# Patient Record
(demographics unavailable — no encounter records)

---

## 2024-10-17 NOTE — BIRTH HISTORY
Show Aperture Variable?: Yes Detail Level: Detailed [At ___ Weeks Gestation] : at [unfilled] weeks gestation [[place] : in [place] [ Section] : by  section [None] : there were no delivery complications [Age Appropriate] : age appropriate developmental milestones met Post-Care Instructions: I reviewed with the patient in detail post-care instructions. Patient is to wear sunprotection, and avoid picking at any of the treated lesions. Pt may apply Vaseline to crusted or scabbing areas. Render Note In Bullet Format When Appropriate: No Duration Of Freeze Thaw-Cycle (Seconds): 0 Consent: The patient's consent was obtained including but not limited to risks of crusting, scabbing, blistering, scarring, darker or lighter pigmentary change, recurrence, incomplete removal and infection.

## 2024-10-17 NOTE — REASON FOR VISIT
[Follow-Up Evaluation] : a follow-up evaluation for [Seizure] : seizure [Patient] : patient [Parents] : parents [Medical Records] : medical records

## 2024-10-17 NOTE — PHYSICAL EXAM
[Well-appearing] : well-appearing [Normocephalic] : normocephalic [No dysmorphic facial features] : no dysmorphic facial features [No abnormal neurocutaneous stigmata or skin lesions] : no abnormal neurocutaneous stigmata or skin lesions [Straight] : straight [No shalonda or dimples] : no shalonda or dimples [No deformities] : no deformities [Alert] : alert [Well related, good eye contact] : well related, good eye contact [Conversant] : conversant [Normal speech and language] : normal speech and language [Follows instructions well] : follows instructions well [VFF] : VFF [Pupils reactive to light and accommodation] : pupils reactive to light and accommodation [Full extraocular movements] : full extraocular movements [Normal facial sensation to light touch] : normal facial sensation to light touch [No facial asymmetry or weakness] : no facial asymmetry or weakness [Gross hearing intact] : gross hearing intact [Equal palate elevation] : equal palate elevation [Good shoulder shrug] : good shoulder shrug [Normal tongue movement] : normal tongue movement [Midline tongue, no fasciculations] : midline tongue, no fasciculations [Normal axial and appendicular muscle tone] : normal axial and appendicular muscle tone [Gets up on table without difficulty] : gets up on table without difficulty [No pronator drift] : no pronator drift [Normal finger tapping and fine finger movements] : normal finger tapping and fine finger movements [No abnormal involuntary movements] : no abnormal involuntary movements [5/5 strength in proximal and distal muscles of arms and legs] : 5/5 strength in proximal and distal muscles of arms and legs [Walks and runs well] : walks and runs well [Able to do deep knee bend] : able to do deep knee bend [Able to walk on heels] : able to walk on heels [Able to walk on toes] : able to walk on toes [Localizes LT and temperature] : localizes LT and temperature [No dysmetria on FTNT] : no dysmetria on FTNT [Good walking balance] : good walking balance [Normal gait] : normal gait [Able to tandem well] : able to tandem well [Negative Romberg] : negative Romberg

## 2024-10-23 NOTE — HISTORY OF PRESENT ILLNESS
[FreeTextEntry1] :  NAYANA GIL is a 17 year old male with no significant past medical history here for seizures.  Interval Hx: Nayana has been doing well. He says he continues to get the feeling of confusion, and anxiousness, and then starts getting dizzy. It goes away on its own in about 1 minute. This has happened once or twice since last visit. Denies any other concern for seizures since last visit. Mother says he is an anxious kid and does get nervous often.   HPI (September 2024):  Nayana had a first time seizure on August 18. This occurred around 1 in the afternoon. He finished eating and went to go sit down. He then had stiffening of the entire body and he was shaking. Sister then laid him on the floor on his side. He was foaming at the mouth and his eyes were open wide. This lasted about 2 minutes. When he started to wake up he had an episode of emesis. After he woke up he was very confused and father said he seemed "out of it" for about 10 minutes. He had a HA and was lethargic for the rest of the day. He was then evaluated in [place] ED for monitoring and given IVF.  Prior to seizure Nayana reports feeling dizzy, anxious, and remembering random dreams he has had. He then got nauseous. The aura he felt prior to seizure he experienced a few times in the past, most recently in May, but never had a seizure after. In May he had an episode of syncope after and then emesis. Denies any previous seizure activity. Denies any myoclonic jerks in sleep or when awakening from sleep.

## 2024-10-23 NOTE — ASSESSMENT
[FreeTextEntry1] :  NAYANA is a 17 year old here with parents for a follow up for seizure like activity.  Non focal neuro exam. rEEG and brain MRI normal, however episode still dose seem very likely to be seizure activity. Will get aEEG. Seizure precautions discussed.

## 2024-10-23 NOTE — CONSULT LETTER
[Dear  ___] : Dear  [unfilled], [Consult Letter:] : I had the pleasure of evaluating your patient, [unfilled]. [Please see my note below.] : Please see my note below. [Consult Closing:] : Thank you very much for allowing me to participate in the care of this patient.  If you have any questions, please do not hesitate to contact me. [Sincerely,] : Sincerely, [FreeTextEntry3] : [place] Claudio, FAZAL-BC Board Certified Family Nurse Practitioner Pediatric Neurology [facility] [address] [place] [address] Tel: [phone] Fax: [phone]

## 2024-10-23 NOTE — HISTORY OF PRESENT ILLNESS
[FreeTextEntry1] :  NAYANA GIL is a 17 year old male with no significant past medical history here for seizures.  Interval Hx: Nayana has been doing well. He says he continues to get the feeling of confusion, and anxiousness, and then starts getting dizzy. It goes away on its own in about 1 minute. This has happened once or twice since last visit. Denies any other concern for seizures since last visit. Mother says he is an anxious kid and does get nervous often.   HPI (September 2024):  Nayana had a first time seizure on August 18. This occurred around 1 in the afternoon. He finished eating and went to go sit down. He then had stiffening of the entire body and he was shaking. Sister then laid him on the floor on his side. He was foaming at the mouth and his eyes were open wide. This lasted about 2 minutes. When he started to wake up he had an episode of emesis. After he woke up he was very confused and father said he seemed "out of it" for about 10 minutes. He had a HA and was lethargic for the rest of the day. He was then evaluated in [place] ED for monitoring and given IVF.  Prior to seizure Nayana reports feeling dizzy, anxious, and remembering random dreams he has had. He then got nauseous. The aura he felt prior to seizure he experienced a few times in the past, most recently in May, but never had a seizure after. In May he had an episode of syncope after and then emesis. Denies any previous seizure activity. Denies any myoclonic jerks in sleep or when awakening from sleep.

## 2024-10-23 NOTE — CONSULT LETTER
[Dear  ___] : Dear  [unfilled], [Consult Letter:] : I had the pleasure of evaluating your patient, [unfilled]. [Please see my note below.] : Please see my note below. [Consult Closing:] : Thank you very much for allowing me to participate in the care of this patient.  If you have any questions, please do not hesitate to contact me. [Sincerely,] : Sincerely, [FreeTextEntry3] : [place] Claudio, FAZAL-BC Board Certified Family Nurse Practitioner Pediatric Neurology [facility] [address] [place] [address] Tel: [phone] Fax: [phone]

## 2024-10-23 NOTE — DATA REVIEWED
[FreeTextEntry1] : MRI BRAIN PROCEDURE DATE:  10/22/2024  INTERPRETATION:  HISTORY: Seizure like activity. R56.9.  Description: A noncontrast brain MRI utilizing an epilepsy protocol was performed with multiplanar multisequence technique, including thin section high-resolution imaging.  Comparison: No prior brain imaging study was available for comparison at this Medical Center.  There is no evidence for intracranial mass, acute infarct, acute hemorrhage, or hydrocephalus. There is no Chiari malformation.  There is no gross evidence for cortical dysplasia or gray matter heterotopia. The volume and signal of the medial temporal lobes appears symmetric on the coronal series.  The major intracranial arterial and dural venous sinus flow voids appear preserved.  Retroflexion of the odontoid process is noted. The foramen magnum remains widely patent.  IMPRESSION:  No focal intracranial abnormality-seizure nidus is noted.     rEEG September 2024:   Recording Technique This is a 21-channel EEG recording done in the awake state. A digital recording along with continuous video recording was obtained placing electrodes utilizing the International 10-20 System of electrode placement. A single channel EKG was also recorded. Standard montages were used for review.   Background The background activity during wakefulness was well organized. It was comprised of symmetric mixture of frequencies appropriate for the patient's age. There was a well-modulated 9 Hz posterior dominant rhythm of   50 microvolts amplitude, responsive to eye opening and eye closure. A normal anterior to posterior gradient was present.   Slowing No focal or generalized slowing was noted.   Interictal Activity/Events None.   Attenuation & Asymmetry None.   Activation Procedures Intermittent photic stimulation in incremental frequencies up to 30 Hz did not produce any abnormal activation of epileptiform activity. Hyperventilation for 3 minutes produced generalized slowing.   EKG No clear abnormalities were noted.   Video No clinical events noted during this study.   Impression This is a normal EEG in the awake state.   Clinical Correlation A normal interictal EEG does not exclude nor support the diagnosis of epilepsy.

## 2024-10-23 NOTE — END OF VISIT
[Time Spent: ___ minutes] : I have spent [unfilled] minutes of time on the encounter which excludes teaching and separately reported services. [FreeTextEntry3] : I, Dr. Link, personally performed the evaluation and management (E/M) services for this established patient who presents today with (a) new problem(s)/exacerbation of (an) existing condition(s). That E/M includes conducting the clinically appropriate interval history &/or exam, assessing all new/exacerbated conditions, and establishing a new plan of care. Today, my IVAN, TIERRA Viera, was here to observe my evaluation and management service for this new problem/exacerbated condition and follow the plan of care established by me going forward.

## 2025-04-28 NOTE — CONSULT LETTER
[Dear  ___] : Dear  [unfilled], [Courtesy Letter:] : I had the pleasure of seeing your patient, [unfilled], in my office today. [Please see my note below.] : Please see my note below. [Consult Closing:] : Thank you very much for allowing me to participate in the care of this patient.  If you have any questions, please do not hesitate to contact me. [Sincerely,] : Sincerely, [FreeTextEntry3] : Lauren Link MD Director, Pediatric Epilepsy [facility] , Pediatric Neurology Residency , [employer] [facility] at [address], [address] [place] [address] Phone: [phone] Fax: [phone]

## 2025-04-28 NOTE — HISTORY OF PRESENT ILLNESS
Airway  Date/Time: 11/12/2019 7:10 AM  Urgency: elective    Airway not difficult    General Information and Staff    Patient location during procedure: OR  Anesthesiologist: Nancy Chester MD  Performed: anesthesiologist     Indications and Patient C
Arterial Line  Performed by: Farhan Mock MD  Authorized by: Farhan Mock MD     General Information and Staff    Procedure Start:  11/12/2019 7:11 AM  Procedure End:  11/12/2019 7:14 AM  Anesthesiologist:  Farhan Mock MD  Perfor
Central Line  Performed by: Mehnaz Esquivel MD  Authorized by: Mehnaz Esquivel MD     General Information and Staff    Procedure Start:  11/12/2019 7:15 AM  Procedure End:  11/12/2019 7:25 AM  Anesthesiologist:  Mehnaz Esquivel MD  Performed
Procedure Performed: BANDAR      Start Time:  11/12/2019 7:45 AM       End Time:   11/12/2019 12:45 PM    Preanesthesia Checklist:  Patient identified, IV assessed, risks and benefits discussed, monitors and equipment assessed, procedure being performed at walls
[FreeTextEntry1] : Carlton is now an 19 yo ambidextrous man here for follow up. He was last seen in this office for a first time likely BERTRAM seizure in 08/2024. Work up including brain MRI and REEG normal, AEEG was not done. He has had 2 more seizure like events since:  Feb 25/ 2025: he was at school, in eighth period. He felt his stereotypic aura ( scared, confused, knows a seizure is about to happen), his friends reported that he was shaking all 4, eyes were rolled up, lasted 2 minutes, he bit his tongue. The school called 911, he was taken to  [facility], Keppra was prescribed.Family did not start it. 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

## 2025-04-28 NOTE — QUALITY MEASURES
Chief Complaint   Patient presents with   • URI       SUBJECTIVE:    Patient is 74 years old and presents with respiratory symptoms that started today. She has a cough. She feels tired and achy. No nasal congestion or drainage. No ear pain or sore throat. No chest pain or shortness breath. No fevers or chills. No nausea, vomiting or diarrhea.  Social History     Socioeconomic History   • Marital status:      Spouse name: Not on file   • Number of children: 1   • Years of education: Not on file   • Highest education level: Not on file   Occupational History   • Occupation:    Social Needs   • Financial resource strain: Not on file   • Food insecurity:     Worry: Not on file     Inability: Not on file   • Transportation needs:     Medical: Not on file     Non-medical: Not on file   Tobacco Use   • Smoking status: Current Every Day Smoker     Packs/day: 0.10     Years: 40.00     Pack years: 4.00     Types: Cigarettes   • Smokeless tobacco: Never Used   Substance and Sexual Activity   • Alcohol use: Yes     Alcohol/week: 0.0 standard drinks     Frequency: Monthly or less     Drinks per session: 1 or 2     Binge frequency: Never     Comment: socially wine or old fashioned    • Drug use: No   • Sexual activity: Yes     Partners: Male   Lifestyle   • Physical activity:     Days per week: Not on file     Minutes per session: Not on file   • Stress: Not on file   Relationships   • Social connections:     Talks on phone: Not on file     Gets together: Not on file     Attends [employer] service: Not on file     Active member of club or organization: Not on file     Attends meetings of clubs or organizations: Not on file     Relationship status: Not on file   • Intimate partner violence:     Fear of current or ex partner: Not on file     Emotionally abused: Not on file     Physically abused: Not on file     Forced sexual activity: Not on file   Other Topics Concern   •  Service Not Asked   • Blood  Transfusions Not Asked   • Caffeine Concern Not Asked   • Occupational Exposure Not Asked   • Hobby Hazards Not Asked   • Sleep Concern Not Asked   • Stress Concern Not Asked   • Weight Concern Not Asked   • Special Diet Not Asked   • Back Care Not Asked   • Exercise Not Asked   • Bike Helmet Not Asked   • Seat Belt No   • Self-Exams Not Asked   Social History Narrative    Travel -  cruise (6/08)         OBJECTIVE:  Visit Vitals  /89 (BP Location: RUE - Right upper extremity, Patient Position: Sitting, Cuff Size: Large Adult)   Pulse 77   Temp 97.6 °F (36.4 °C) (Temporal)   Resp 16   Ht 5' 4\" (1.626 m)   Wt 92 kg   SpO2 97%   BMI 34.81 kg/m²     Gen.: Non-ill-appearing.  Skin: No pallor or jaundice. No erythema or petechiae.  HEENT: Conjunctiva noninjected. Tympanic membranes are both normal. Posterior pharynx without erythema, swelling or exudate. There is postnasal drip.  Lungs: No coughing. No wheezes or crackles. Good air movement. Pulse ox 97% on room air.  Cor: Regular rhythm no murmur or rub.  Neuro: No meningeal signs.     Viral URI with cough  (primary encounter diagnosis)    Plan: Most likely has viral respiratory infection. Onset symptoms today. No antibiotic needed at this time. Can use over-the-counter medications as needed. Follow-up if problems or concerns.         [Seizure frequency] : Seizure frequency: Yes [Etiology, seizure type, and epilepsy syndrome] : Etiology, seizure type, and epilepsy syndrome: Yes [Side effects of anti-seizure medications] : Side effects of anti-seizure medications: Yes [Safety and education around seizures] : Safety and education around seizures: Yes [Sudden unexpected death in epilepsy (SUDEP)] : Sudden unexpected death in epilepsy: Yes [Issues around driving] : Issues around driving: Yes [Screening for anxiety, depression] : Screening for anxiety, depression: Yes [Treatment-resistant epilepsy (every visit)] : Treatment-resistant epilepsy (every visit): Yes [Adherence to medication(s)] : Adherence to medication(s): Yes [Counseling for women of childbearing potential with epilepsy (including folic acid supplement)] : Counseling for women of childbearing potential with epilepsy (including folic acid supplement): Not Applicable [Options for adjunctive therapy (Neurostimulation, CBD, Dietary Therapy, Epilepsy Surgery)] : Options for adjunctive therapy (Neurostimulation, CBD, Dietary Therapy, Epilepsy Surgery): Not Applicable [25 Hydroxy Vitamin D level assessed and Vitamin D3 ordered] : 25 Hydroxy Vitamin D level assessed and Vitamin D3 ordered: Not Applicable [Thyroid profile ordered] : Thyroid profile ordered: Not Applicable

## 2025-04-28 NOTE — PHYSICAL EXAM
[Well-appearing] : well-appearing [Normocephalic] : normocephalic [No dysmorphic facial features] : no dysmorphic facial features [No ocular abnormalities] : no ocular abnormalities [Soft] : soft [No abnormal neurocutaneous stigmata or skin lesions] : no abnormal neurocutaneous stigmata or skin lesions [No deformities] : no deformities [Alert] : alert [Well related, good eye contact] : well related, good eye contact [Conversant] : conversant [Normal speech and language] : normal speech and language [Follows instructions well] : follows instructions well [Pupils reactive to light and accommodation] : pupils reactive to light and accommodation [Full extraocular movements] : full extraocular movements [Saccadic and smooth pursuits intact] : saccadic and smooth pursuits intact [No nystagmus] : no nystagmus [No facial asymmetry or weakness] : no facial asymmetry or weakness [Gross hearing intact] : gross hearing intact [Normal tongue movement] : normal tongue movement [Midline tongue, no fasciculations] : midline tongue, no fasciculations [Normal axial and appendicular muscle tone] : normal axial and appendicular muscle tone [Gets up on table without difficulty] : gets up on table without difficulty [No pronator drift] : no pronator drift [Normal finger tapping and fine finger movements] : normal finger tapping and fine finger movements [No abnormal involuntary movements] : no abnormal involuntary movements [5/5 strength in proximal and distal muscles of arms and legs] : 5/5 strength in proximal and distal muscles of arms and legs [2+ biceps] : 2+ biceps [Good walking balance] : good walking balance [Normal gait] : normal gait [Able to tandem well] : able to tandem well [de-identified] : Thin anxious teenager [de-identified] : 1+ in LEs

## 2025-04-28 NOTE — DATA REVIEWED
[FreeTextEntry1] : EXAM: [id number] - MR BRAIN  - ORDERED BY: BONIFACIO MONSON   PROCEDURE DATE:  10/22/2024    INTERPRETATION:  HISTORY: Seizure like activity. R56.9.  Description: A noncontrast brain MRI utilizing an epilepsy protocol was performed with multiplanar multisequence technique, including thin section high-resolution imaging.  Comparison: No prior brain imaging study was available for comparison at this Medical Center.  There is no evidence for intracranial mass, acute infarct, acute hemorrhage, or hydrocephalus. There is no Chiari malformation.  There is no gross evidence for cortical dysplasia or gray matter heterotopia. The volume and signal of the medial temporal lobes appears symmetric on the coronal series.  The major intracranial arterial and dural venous sinus flow voids appear preserved.  Retroflexion of the odontoid process is noted. The foramen magnum remains widely patent.  IMPRESSION:  No focal intracranial abnormality-seizure nidus is noted.   Reason For Visit     EEG Recording Identification:   Type: Ambulatory, without video Recording Start Time: 3/3/25  Recording End Time: 3/4/25   Conceptional Age: 18 years old   Referring MD: NICK Chin  Active Problems Seizure-like activity (780.39) (R56.9)        Results/Data       Recording Technique The patient underwent continuous ambulatory EEG monitoring using a cable telemetry system [employer]. The EEG was recorded from 21 electrodes using the standard 10/20 placement. The EEG was continuously sampled on disk, and spike detection and seizure detection algorithms marked portions of the EEG for further analysis offline. Data was stored on disk for important clinical events (indicated by manual pushbutton) and for periods identified by the seizure detection algorithm, and analyzed offline. EEG data was reviewed by the electroencephalographer, and selected segments were archived on compact disc.   Background in Wakefulness The background activity during wakefulness was well organized. It was comprised of symmetric mixture of frequencies appropriate for the patient's age. There was a well-modulated 10 Hz posterior dominant rhythm of  20-80 microvolts amplitude, responsive to eye opening and eye closure. A normal anterior to posterior gradient was present. As the patient became drowsy, there was an attenuation of the background activity and the appearance of widespread, irregular slower frequency activity.   Background in Drowsiness/Sleep As the patient became drowsy, there was an attenuation of the background activity and the appearance of widespread, irregular slower frequency activity. Vertex sharp transients appeared, and eventually the patient attained stage II sleep, with symmetric age appropriate sleep spindles. Normal slow wave sleep was achieved.   Slowing No focal or generalized slowing was noted.   Interictal Activity/Events None.   Attenuation & Asymmetry None.   Activation Procedures Intermittent photic stimulation in incremental frequencies up to 30 Hz did not produce any abnormal activation of epileptiform activity. Hyperventilation for 3 minutes produced generalized slowing.   EKG No clear abnormalities were noted.   Video No clinical events noted during this study. No video to correlate.   Impression This is a normal AEEG study. No seizures or patient events were captured. Clinical correlation is recommended.   Clinical Correlation A normal interictal EEG does not exclude nor support the diagnosis of epilepsy.

## 2025-04-28 NOTE — ASSESSMENT
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 However the semiology seems very consistent with BERTRAM seizures. We will start LCM. Risks, benefits and alternatives were discussed and printed medication information was provided. Genetic testing to be considered along with labs at the next visit in 6 weeks

## 2025-04-28 NOTE — REASON FOR VISIT
[Follow-Up Evaluation] : a follow-up evaluation for [Seizure] : seizure [Patient] : patient [Mother] : mother [Family Member] : family member